# Patient Record
(demographics unavailable — no encounter records)

---

## 2024-10-24 NOTE — PLAN
[FreeTextEntry1] : HTN- pt to ck home BP and send me readings .  pt to bring BP machine to next visit.   relaxation techniques discussed.  flu vaccine given
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
## 2024-11-14 NOTE — ASSESSMENT
[FreeTextEntry1] : 66 y/o F with history of cerebral aneurysm rupture in 2019 s/p surgical repair previously on aspirin full dose and Plavix now only on baby aspirin, previous anemia in setting of diet now with reoccurrence here for follow up after reinitiating oral supplementation.  Anemia previously found to be related to diet as she does not eat meat, although she has added it into her diet at this point. Hemoglobin normalized with an increased MCV, concerning for possible B12/Folate deficiency (pending B12/folate). On previous labs, her MCV was normal with borderline low iron studies indicative of previously treated iron deficiency anemia (that has not improved).  Plan: - Monitor off iron supplements - Continue pantoprazole x 6 months total therapy.  - Will repeat CBC, CMP, iron panel, B12, and folate. - No need for routine follow up with Hematologist. Can follow up with PMD -Patient understands and agrees with plan. All information explained to the best of my ability.    Pt seen and examined with Dr. Goldberg.  Simin Rodríguez MD Hematology and Medical Oncology Fellow
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
## 2024-11-14 NOTE — PHYSICAL EXAM
[Fully active, able to carry on all pre-disease performance without restriction] : Status 0 - Fully active, able to carry on all pre-disease performance without restriction [Normal] : affect appropriate [de-identified] : pale conjunctiva [de-identified] : supple [de-identified] : (+)S1S2 RRR [de-identified] : no edema [de-identified] : warm/dry

## 2024-11-14 NOTE — REVIEW OF SYSTEMS
[Fatigue] : fatigue [Negative] : Allergic/Immunologic [Fever] : no fever [Chills] : no chills [Night Sweats] : no night sweats [Recent Change In Weight] : ~T no recent weight change [Eye Pain] : no eye pain [Red Eyes] : eyes not red [Dry Eyes] : no dryness of the eyes [Vision Problems] : no vision problems [Dysphagia] : no dysphagia [Nosebleeds] : no nosebleeds [Odynophagia] : no odynophagia [Chest Pain] : no chest pain [Palpitations] : no palpitations [Lower Ext Edema] : no lower extremity edema [Dysuria] : no dysuria [Insomnia] : no insomnia [Anxiety] : no anxiety [Hot Flashes] : no hot flashes [FreeTextEntry2] : (+) PICA [FreeTextEntry3] : wears glasses
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
## 2024-11-14 NOTE — PHYSICAL EXAM
[Fully active, able to carry on all pre-disease performance without restriction] : Status 0 - Fully active, able to carry on all pre-disease performance without restriction [Normal] : affect appropriate [de-identified] : pale conjunctiva [de-identified] : supple [de-identified] : (+)S1S2 RRR [de-identified] : no edema [de-identified] : warm/dry

## 2024-11-14 NOTE — ASSESSMENT
[FreeTextEntry1] : 68 y/o F with history of cerebral aneurysm rupture in 2019 s/p surgical repair previously on aspirin full dose and Plavix now only on baby aspirin, previous anemia in setting of diet now with reoccurrence here for follow up after reinitiating oral supplementation.  Anemia previously found to be related to diet as she does not eat meat, although she has added it into her diet at this point. Hemoglobin normalized with an increased MCV, concerning for possible B12/Folate deficiency (pending B12/folate). On previous labs, her MCV was normal with borderline low iron studies indicative of previously treated iron deficiency anemia (that has not improved).  Plan: - Monitor off iron supplements - Continue pantoprazole x 6 months total therapy.  - Will repeat CBC, CMP, iron panel, B12, and folate. - No need for routine follow up with Hematologist. Can follow up with PMD -Patient understands and agrees with plan. All information explained to the best of my ability.    Pt seen and examined with Dr. Goldberg.  Simin oRdríguez MD Hematology and Medical Oncology Fellow

## 2024-12-16 NOTE — HISTORY OF PRESENT ILLNESS
[FreeTextEntry1] : Ms. Orona is a 69 y/o female s/p cerebral aneurysm - CVA - HTN, allergic rhinitis, asthma, GERD, presenting to the office for a follow-up pulmonary evaluation. Her chief complaint is finding her sense of purpose.  -she notes feeling generally well  -she notes her  had a knee replacement 2 weeks ago -she notes energy levels are good (8/10) -she notes exercising without limitations ( at the gym, yoga) -she notes feeling strong -she notes her balance could be improved -she notes she has 5 grandchildren, and she volunteers. Despite these things, she feels stuck -she notes s/p EgD -she notes she had the PNA vaccine 2024  -she denies any headaches, nausea, emesis, fever, chills, sweats, chest pain, chest pressure, coughing, wheezing, palpitations, diarrhea, constipation, dysphagia, vertigo, arthralgias, myalgias, leg swelling, itchy eyes, itchy ears, heartburn, reflux, or sour taste in the mouth.

## 2024-12-16 NOTE — PHYSICAL EXAM
[No Acute Distress] : no acute distress [No Deformities] : no deformities [Normal Oropharynx] : normal oropharynx [II] : Mallampati Class: II [Normal Appearance] : normal appearance [No Neck Mass] : no neck mass [Normal Rate/Rhythm] : normal rate/rhythm [Normal S1, S2] : normal s1, s2 [No Murmurs] : no murmurs [No Resp Distress] : no resp distress [Clear to Auscultation Bilaterally] : clear to auscultation bilaterally [No Abnormalities] : no abnormalities [Benign] : benign [Normal Gait] : normal gait [No Clubbing] : no clubbing [No Cyanosis] : no cyanosis [No Edema] : no edema [FROM] : FROM [Normal Color/ Pigmentation] : normal color/ pigmentation [No Focal Deficits] : no focal deficits [Oriented x3] : oriented x3 [Normal Affect] : normal affect [TextBox_68] : I:E ratio 1:3; clear

## 2024-12-16 NOTE — ADDENDUM
[FreeTextEntry1] : Documented by Anne Soto acting as a scribe for Dr. Myles Goldberg on 12/16/2024. All medical record entries made by the Scribe were at my, Dr. Myles Goldberg's, direction and personally dictated by me on 12/16/2024. I have reviewed the chart and agree that the record accurately reflects my personal performance of the history, physical exam, assessment and plan. I have also personally directed, reviewed, and agree with the discharge instructions.

## 2024-12-16 NOTE — ASSESSMENT
[FreeTextEntry1] : Ms. Orona is 67 yo and is s/p cerebral aneurysm - CVA -HTN, asthma, allergies, GERD, RI, MVP, diverticulitis, Fe anemia - s/p infusions - improved w/ FDC (inertia)  SOB multifactorial due to: -asthma -anxiety -poor breathing mechanics -?CAD  Problem 1: moderate persistent asthma- controlled -s/p Prednisone 20mg for 7 days then 10mg for 7 days- 12/2021 -Information sheet given to the patient to be reviewed, this medication is never to be used without consulting the prescribing physician. Proper dietary restraint is necessary specifically salt containing foods, if any reaction may occur should be reported. -continue Asmanex 2 inhalations BID -continue Singulair 10 mg QHS -continue Ventolin PRN -Asthma is believed to be caused by inherited (genetic) and environmental factor, but its exact cause is unknown. Asthma may be triggered by allergens, lung infections, or irritants in the air. Asthma triggers are different for each person -Inhaler technique reviewed as well as oral hygiene techniques reviewed with patient. Avoidance of cold air, extremes of temperature, rescue inhaler should be used before exercise. Order of medication reviewed with patient. Recommended use of a cool mist humidifier in the bedroom.  problem 1A: cardiac -recommended to follow up with her cardiologist (FRANDY Leon et al.) -BP improved w/ [facility]  Problem 2: allergies/hx of allergic rhinitis -Continue Atrovent 0.6% at 1 sniff/nostril, up to TID -Continue Epi Pen prn -Continue Pataday prn -Recommend Borage and flaxseed oil for contact dermatitis -Environmental measures for allergies were encouraged including mattress and pillow covers, air purifier, and environmental controls.  Problem 3: GERD- stable (Danette) -Recommend chew DGL -off meds, no issues s/p EgD 1/2024 -Rule of 2s: avoid eating too much, eating too late, eating too spicy, eating two hours before bed. -Things to avoid including overeating, spicy foods, tight clothing, eating within three hours of bed, this list is not all inclusive. -For treatment of reflux, possible options discussed including diet control, H2 blockers, PPIs, as well as coating motility agents discussed as treatment options. Timing of meals and proximity of last meal to sleep were discussed. If symptoms persist, a formal gastrointestinal evaluation is needed.  Problem 4: Anxiety -Continue Xanax prn (ISTOP Checked) (.25)  Problem 4A: Poor Balance -recommended the exercise ladder -recommended the cordless jump rope  Problem 5: Health Maintenance/COVID19 Precautions - s/p COVID vaccine Moderna x3 (2/2021) 10/2021 - Clean your hands often. Wash your hands often with soap and water for at least 20 seconds, especially after blowing your nose, coughing, or sneezing, or having been in a public place. - If soap and water are not available, use a hand  that contains at least 60% alcohol. - To the extent possible, avoid touching high-touch surfaces in public places - elevator buttons, door handles, handrails, handshaking with people, etc. Use a tissue or your sleeve to cover your hand or finger if you must touch something. - Wash your hands after touching surfaces in public places. - Avoid touching your face, nose, eyes, etc. - Clean and disinfect your home to remove germs: practice routine cleaning of frequently touched surfaces (for example: tables, doorknobs, light switches, handles, desks, toilets, faucets, sinks & cell phones) - Avoid crowds, especially in poorly ventilated spaces. Your risk of exposure to respiratory viruses like COVID-19 may increase in crowded, closed-in settings with little air circulation if there are people in the crowd who are sick. All patients are recommended to practice social distancing and stay at least 6 feet away from others. - Avoid all non-essential travel including plane trips, and especially avoid embarking on cruise ships. -If COVID-19 is spreading in your community, take extra measures to put distance between yourself and other people to further reduce your risk of being exposed to this new virus. -Stay home as much as possible. - Consider ways of getting food brought to your house through family, social, or commercial networks -Be aware that the virus has been known to live in the air up to 3 hours post exposure, cardboard up to 24 hours post exposure, copper up to 4 hours post exposure, steel and plastic up to 2-3 days post exposure. Risk of transmission from these surfaces are affected by many variables. COVID-19 precautionary Immune Support Recommendations: -OTC Vitamin C 500mg BID -OTC Quercetin 250-500mg BID -OTC Zinc 75-100mg per day -OTC Melatonin 1 or 2mg a night -OTC Vitamin D 1-4000mg per day -OTC Tonic Water 8oz per day -Water 0.5-1 gallon per day Asthma and COVID19:  You need to make sure your asthma is under control. This often requires the use of inhaled corticosteroids (and sometimes oral corticosteroids). Inhaled corticosteroids do not likely reduce your immune system's ability to fight infections, but oral corticosteroids may. It is important to use the steps above to protect yourself to limit your exposure to any respiratory virus.  problem 5A: poor breathing mechanics -recommended breathing techniques Sánchez Fry -Proper breathing techniques were reviewed with an emphasis of exhalation. Patient instructed to breath in for 1 second and out for four seconds. Patient was encouraged to not talk while walking.  Problem 6: health maintenance -recommended RSV vaccine 2024 -[facility] on [facility] -recommended Dr. Hieu Beck 10 day detox -recommended strep pneumonia vaccines: Prevnar-20 vaccine (completed 2/3/2024)  -recommended early intervention for URIs -recommended regular osteoporosis evaluations -recommended early dermatological evaluations -recommended after the age of 50 to the age of 70, colonoscopy every 5 years  F/P in 4 months with [facility] pt is encouraged to call or fax the office with any questions or concerns.

## 2025-02-01 NOTE — HISTORY OF PRESENT ILLNESS
[FreeTextEntry1] : Ms. CASTELLANOS presents for the evaluation of HTN Overall she has been doing well. She notes some recent stress which has resulted in elevated BPs.  Prior to this stressor she was averaging 130/80 She is exercises regularly without issue.

## 2025-02-04 NOTE — PLAN
[FreeTextEntry1] : vit B12- rec stopping anemia- resolved HTN- stable- cont losartan 100 mga daily, hctz 12.5 daily hand pain- prob tendonitis.  rec diclofenac cream. heat -10 min f/u for CPE

## 2025-02-04 NOTE — PHYSICAL EXAM
[TextEntry] : Constitutional: no acute distress, well nourished, well developed and well-appearing. Pulmonary: no respiratory distress, lungs were clear to auscultation bilaterally, no accessory muscle use. Cardiac: normal rate, with a regular rhythm, normal S1 and S2 and no murmur heard.  Vascular: there was no peripheral edema. Abdomen: abdomen soft, non-tender, non-distended, no abdominal mass palpated and normal bowel sounds. Psychiatric: the affect was normal and insight and judgement were intact [de-identified] : no R hand swelling.  good ROM of R wrist- no pain

## 2025-02-04 NOTE — PHYSICAL EXAM
[TextEntry] : Constitutional: no acute distress, well nourished, well developed and well-appearing. Pulmonary: no respiratory distress, lungs were clear to auscultation bilaterally, no accessory muscle use. Cardiac: normal rate, with a regular rhythm, normal S1 and S2 and no murmur heard.  Vascular: there was no peripheral edema. Abdomen: abdomen soft, non-tender, non-distended, no abdominal mass palpated and normal bowel sounds. Psychiatric: the affect was normal and insight and judgement were intact [de-identified] : no R hand swelling.  good ROM of R wrist- no pain
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
## 2025-06-18 NOTE — PHYSICAL EXAM
[No Acute Distress] : no acute distress [No Deformities] : no deformities [Normal Oropharynx] : normal oropharynx [Normal Appearance] : normal appearance [No Neck Mass] : no neck mass [Normal Rate/Rhythm] : normal rate/rhythm [Normal S1, S2] : normal s1, s2 [No Murmurs] : no murmurs [No Resp Distress] : no resp distress [Clear to Auscultation Bilaterally] : clear to auscultation bilaterally [No Abnormalities] : no abnormalities [Benign] : benign [Normal Gait] : normal gait [No Clubbing] : no clubbing [No Cyanosis] : no cyanosis [No Edema] : no edema [FROM] : FROM [Normal Color/ Pigmentation] : normal color/ pigmentation [No Focal Deficits] : no focal deficits [Oriented x3] : oriented x3 [Normal Affect] : normal affect [III] : Mallampati Class: III [TextBox_68] : I:E ratio 1:3; clear

## 2025-06-18 NOTE — ADDENDUM
[FreeTextEntry1] : Documented by Yohana Sosa acting as a scribe for Dr. Myles Goldberg on 06/18/2025. All medical record entries made by the Scribe were at my, Dr. Myles Goldberg's, direction and personally dictated by me on 06/18/2025.  I have reviewed the chart and agree that the record accurately reflects my personal performance of the history, physical exam, assessment and plan. I have also personally directed, reviewed, and agree with the discharge instructions.

## 2025-06-18 NOTE — HISTORY OF PRESENT ILLNESS
[FreeTextEntry1] : Ms. Orona is a 69 y/o female s/p cerebral aneurysm - CVA - HTN, allergic rhinitis, asthma, GERD, presenting to the office for a follow-up pulmonary evaluation. Her chief complaint is allergy Sx.  -she notes energy levels are good -she notes having floaters in her eyes -she notes seeing ophthalmologist  -she notes exercising (bike, treadmill) -she notes improving her balance through the powerplate  -she notes allergies were bad this past season  -she notes good quality of sleep -she notes constant runny eyes -she denies taking any new medications, vitamins, or supplements -she notes taking losartan -she notes weight is stable -she notes diet is good  -she denies any headaches, nausea, emesis, fever, chills, sweats, chest pain, chest pressure, coughing, wheezing, palpitations, diarrhea, constipation, dysphagia, vertigo, arthralgias, myalgias, leg swelling, itchy ears, heartburn, reflux, or sour taste in the mouth

## 2025-06-18 NOTE — PROCEDURE
[FreeTextEntry1] : FENO was 28; a normal value being less than 25  Fractional exhaled nitric oxide (FENO) is regarded as a simple, noninvasive method for assessing eosinophilic airway inflammation. Produced by a variety of cells within the lung, nitric oxide (NO) concentrations are generally low in healthy individuals. However, high concentrations of NO appear to be involved in nonspecific host defense mechanisms and chronic inflammatory diseases such as asthma. The American Thoracic Society (ATS) therefore has recommended using FENO to aid in the diagnosis and monitoring of eosinophilic airway inflammation and asthma, and for identifying steroid responsive individuals whose chronic respiratory symptoms may be caused by airway inflammation.   Full PFT reveals mild obstructive dysfunction; FEV1 was 2.10 L which is  93% of predicted; normal lung volumes; normal diffusion at 17.04, which is 87% of predicted; normal flow volume loop. PFTs were performed to evaluate for SOB, asthma

## 2025-06-18 NOTE — ASSESSMENT
[FreeTextEntry1] : Ms. Orona is 69 yo and is s/p cerebral aneurysm - CVA -HTN, asthma, allergies, GERD, RI, MVP, diverticulitis, Fe anemia - s/p infusions - improved w/ USP (inertia); allergy Sx  SOB multifactorial due to: -asthma -anxiety -poor breathing mechanics -?CAD  Problem 1: moderate persistent asthma- controlled -s/p Prednisone 20mg for 7 days then 10mg for 7 days- 12/2021 -Information sheet given to the patient to be reviewed, this medication is never to be used without consulting the prescribing physician. Proper dietary restraint is necessary specifically salt containing foods, if any reaction may occur should be reported. -continue Asmanex 2 inhalations BID -continue Singulair 10 mg QHS -continue Ventolin PRN -Asthma is believed to be caused by inherited (genetic) and environmental factor, but its exact cause is unknown. Asthma may be triggered by allergens, lung infections, or irritants in the air. Asthma triggers are different for each person -Inhaler technique reviewed as well as oral hygiene techniques reviewed with patient. Avoidance of cold air, extremes of temperature, rescue inhaler should be used before exercise. Order of medication reviewed with patient. Recommended use of a cool mist humidifier in the bedroom.  problem 1A: cardiac -recommended to follow up with her cardiologist (FRANDY Leon et al.) -BP improved w/ [facility]  Problem 2: allergies/hx of allergic rhinitis -Continue Atrovent 0.6% at 1 sniff/nostril, up to TID -add Olopatadine 0.6% 1 sniff BID -Continue Epi Pen prn -Continue Pataday prn -Recommend Borage and flaxseed oil for contact dermatitis -Environmental measures for allergies were encouraged including mattress and pillow covers, air purifier, and environmental controls.  Problem 3: GERD- stable (Danette) -Recommend chew DGL -off meds, no issues s/p EgD 1/2024 -Rule of 2s: avoid eating too much, eating too late, eating too spicy, eating two hours before bed. -Things to avoid including overeating, spicy foods, tight clothing, eating within three hours of bed, this list is not all inclusive. -For treatment of reflux, possible options discussed including diet control, H2 blockers, PPIs, as well as coating motility agents discussed as treatment options. Timing of meals and proximity of last meal to sleep were discussed. If symptoms persist, a formal gastrointestinal evaluation is needed.  Problem 4: Anxiety -Continue Xanax prn (ISTOP Checked) (.25)  Problem 4A: Poor Balance -recommended the exercise ladder -recommended the cordless jump rope  Problem 5: Health Maintenance/COVID19 Precautions - s/p COVID vaccine Moderna x3 (2/2021) 10/2021 - Clean your hands often. Wash your hands often with soap and water for at least 20 seconds, especially after blowing your nose, coughing, or sneezing, or having been in a public place. - If soap and water are not available, use a hand  that contains at least 60% alcohol. - To the extent possible, avoid touching high-touch surfaces in public places - elevator buttons, door handles, handrails, handshaking with people, etc. Use a tissue or your sleeve to cover your hand or finger if you must touch something. - Wash your hands after touching surfaces in public places. - Avoid touching your face, nose, eyes, etc. - Clean and disinfect your home to remove germs: practice routine cleaning of frequently touched surfaces (for example: tables, doorknobs, light switches, handles, desks, toilets, faucets, sinks & cell phones) - Avoid crowds, especially in poorly ventilated spaces. Your risk of exposure to respiratory viruses like COVID-19 may increase in crowded, closed-in settings with little air circulation if there are people in the crowd who are sick. All patients are recommended to practice social distancing and stay at least 6 feet away from others. - Avoid all non-essential travel including plane trips, and especially avoid embarking on cruise ships. -If COVID-19 is spreading in your community, take extra measures to put distance between yourself and other people to further reduce your risk of being exposed to this new virus. -Stay home as much as possible. - Consider ways of getting food brought to your house through family, social, or commercial networks -Be aware that the virus has been known to live in the air up to 3 hours post exposure, cardboard up to 24 hours post exposure, copper up to 4 hours post exposure, steel and plastic up to 2-3 days post exposure. Risk of transmission from these surfaces are affected by many variables. COVID-19 precautionary Immune Support Recommendations: -OTC Vitamin C 500mg BID -OTC Quercetin 250-500mg BID -OTC Zinc 75-100mg per day -OTC Melatonin 1 or 2mg a night -OTC Vitamin D 1-4000mg per day -OTC Tonic Water 8oz per day -Water 0.5-1 gallon per day Asthma and COVID19:  You need to make sure your asthma is under control. This often requires the use of inhaled corticosteroids (and sometimes oral corticosteroids). Inhaled corticosteroids do not likely reduce your immune system's ability to fight infections, but oral corticosteroids may. It is important to use the steps above to protect yourself to limit your exposure to any respiratory virus.  problem 5A: poor breathing mechanics -recommended breathing techniques Sánchez Fry -Proper breathing techniques were reviewed with an emphasis of exhalation. Patient instructed to breath in for 1 second and out for four seconds. Patient was encouraged to not talk while walking.  Problem 6: health maintenance -recommended RSV vaccine 2024 -recommended [employer] Stretches on YouTube -recommended Dr. Hieu Beck 10 day detox -recommended strep pneumonia vaccines: Prevnar-20 vaccine (completed 2/3/2024)  -recommended early intervention for URIs -recommended regular osteoporosis evaluations -recommended early dermatological evaluations -recommended after the age of 50 to the age of 70, colonoscopy every 5 years  F/P in 4 months with [facility] pt is encouraged to call or fax the office with any questions or concerns.

## 2025-07-23 NOTE — DISCUSSION/SUMMARY
[FreeTextEntry1] : Overall doing well from a cardiac standpoint. Continue present medications. Follow up in 6 months.

## 2025-07-30 NOTE — PHYSICAL EXAM
[Well Nourished] : well nourished [Well Developed] : well developed [Well-Appearing] : well-appearing [Normal Sclera/Conjunctiva] : normal sclera/conjunctiva [PERRL] : pupils equal round and reactive to light [Normal Outer Ear/Nose] : the outer ears and nose were normal in appearance [No Lymphadenopathy] : no lymphadenopathy [Supple] : supple [No Respiratory Distress] : no respiratory distress  [No Accessory Muscle Use] : no accessory muscle use [Clear to Auscultation] : lungs were clear to auscultation bilaterally [Normal Rate] : normal rate  [Regular Rhythm] : with a regular rhythm [Normal S1, S2] : normal S1 and S2 [No Murmur] : no murmur heard [No Carotid Bruits] : no carotid bruits [Pedal Pulses Present] : the pedal pulses are present [No Edema] : there was no peripheral edema [Soft] : abdomen soft [Non Tender] : non-tender [Non-distended] : non-distended [No Masses] : no abdominal mass palpated [Normal Bowel Sounds] : normal bowel sounds [Normal Supraclavicular Nodes] : no supraclavicular lymphadenopathy [Normal Axillary Nodes] : no axillary lymphadenopathy [Normal Posterior Cervical Nodes] : no posterior cervical lymphadenopathy [Normal Anterior Cervical Nodes] : no anterior cervical lymphadenopathy [Normal Inguinal Nodes] : no inguinal lymphadenopathy [Grossly Normal Strength/Tone] : grossly normal strength/tone [No Focal Deficits] : no focal deficits [Normal Gait] : normal gait [Normal Affect] : the affect was normal [Normal Insight/Judgement] : insight and judgment were intact [de-identified] : enlg thyroid- [place] thyroid 2020 -nl

## 2025-07-30 NOTE — CURRENT MEDS
[Takes medication as prescribed] : takes [None] : Patient does not have any barriers to medication adherence [Yes] : Reviewed medication list for presence of high-risk medications. [Benzodiazepines] : benzodiazepines [Opioids] : opioids [Blood Thinners] : blood thinners [Muscle Relaxants] : muscle relaxants [Sedatives] : sedatives No

## 2025-07-30 NOTE — HEALTH RISK ASSESSMENT
[Good] : ~his/her~ current health as good [Yes] : Yes [2 - 3 times a week (3 pts)] : 2 - 3  times a week (3 points) [1 or 2 (0 pts)] : 1 or 2 (0 points) [Never (0 pts)] : Never (0 points) [No] : In the past 12 months have you used drugs other than those required for medical reasons? No [No falls in past year] : Patient reported no falls in the past year [0] : 2) Feeling down, depressed, or hopeless: Not at all (0) [PHQ-2 Negative - No further assessment needed] : PHQ-2 Negative - No further assessment needed [Never] : Never [Patient reported mammogram was normal] : Patient reported mammogram was normal [Patient reported PAP Smear was normal] : Patient reported PAP Smear was normal [Patient reported bone density results were abnormal] : Patient reported bone density results were abnormal [Patient reported colonoscopy was normal] : Patient reported colonoscopy was normal [HIV test declined] : HIV test declined [Hepatitis C test declined] : Hepatitis C test declined [Retired] : retired [] :  [Fully functional (bathing, dressing, toileting, transferring, walking, feeding)] : Fully functional (bathing, dressing, toileting, transferring, walking, feeding) [Fully functional (using the telephone, shopping, preparing meals, housekeeping, doing laundry, using] : Fully functional and needs no help or supervision to perform IADLs (using the telephone, shopping, preparing meals, housekeeping, doing laundry, using transportation, managing medications and managing finances) [With Patient/Caregiver] : , with patient/caregiver [Designated Healthcare Proxy] : Designated healthcare proxy [Relationship: ___] : Relationship: [unfilled] [Audit-CScore] : 3 [[id number]] : 0 [Change in mental status noted] : No change in mental status noted [Language] : denies difficulty with language [Behavior] : denies difficulty with behavior [Learning/Retaining New Information] : denies difficulty learning/retaining new information [Handling Complex Tasks] : denies difficulty handling complex tasks [Reasoning] : denies difficulty with reasoning [Spatial Ability and Orientation] : denies difficulty with spatial ability and orientation [With Family] : lives with family [Feels Safe at Home] : Feels safe at home [Reports changes in hearing] : Reports no changes in hearing [Sunscreen] : uses sunscreen [MammogramDate] : 04/25 [PapSmearDate] : 03/24 [PapSmearComments] : sees GYN every other yearly- Dr. Sandra Fragoso- [BoneDensityDate] : 06/23 [BoneDensityComments] : osteopenia [ColonoscopyDate] : 11/23 [ColonoscopyComments] : as per pt- rpt 5 yrs [de-identified] :  [de-identified] : reivewed 6/12/25 ophth- notes- PVD [de-identified] : seen dentist Dr. Adam - has appt next wk [AdvancecareDate] : 07/25